# Patient Record
Sex: MALE | Race: WHITE | NOT HISPANIC OR LATINO | RURAL
[De-identification: names, ages, dates, MRNs, and addresses within clinical notes are randomized per-mention and may not be internally consistent; named-entity substitution may affect disease eponyms.]

---

## 2024-11-29 ENCOUNTER — OFFICE VISIT (OUTPATIENT)
Dept: FAMILY MEDICINE | Facility: CLINIC | Age: 64
End: 2024-11-29
Payer: MEDICARE

## 2024-11-29 VITALS
TEMPERATURE: 98 F | WEIGHT: 253 LBS | OXYGEN SATURATION: 98 % | SYSTOLIC BLOOD PRESSURE: 122 MMHG | DIASTOLIC BLOOD PRESSURE: 80 MMHG | HEART RATE: 90 BPM

## 2024-11-29 DIAGNOSIS — H60.312 ACUTE DIFFUSE OTITIS EXTERNA OF LEFT EAR: Primary | ICD-10-CM

## 2024-11-29 PROBLEM — R05.3 CHRONIC COUGH: Status: ACTIVE | Noted: 2022-05-27

## 2024-11-29 PROBLEM — J84.89 INTERSTITIAL LUNG DISEASE DUE TO GRANULOMATOUS DISEASE: Status: ACTIVE | Noted: 2024-11-29

## 2024-11-29 PROBLEM — B18.2 CHRONIC HEPATITIS C: Status: ACTIVE | Noted: 2024-11-29

## 2024-11-29 PROBLEM — C34.90 PRIMARY ADENOCARCINOMA OF LUNG: Status: ACTIVE | Noted: 2021-04-30

## 2024-11-29 PROBLEM — J70.1 CHRONIC AND OTHER PULMONARY MANIFESTATIONS DUE TO RADIATION: Status: ACTIVE | Noted: 2022-05-27

## 2024-11-29 PROBLEM — E66.3 OVERWEIGHT: Status: ACTIVE | Noted: 2024-11-29

## 2024-11-29 PROBLEM — D71 INTERSTITIAL LUNG DISEASE DUE TO GRANULOMATOUS DISEASE: Status: ACTIVE | Noted: 2024-11-29

## 2024-11-29 PROBLEM — R59.0 MEDIASTINAL LYMPHADENOPATHY: Status: ACTIVE | Noted: 2024-11-29

## 2024-11-29 PROBLEM — B19.20 UNSPECIFIED VIRAL HEPATITIS C WITHOUT HEPATIC COMA: Status: ACTIVE | Noted: 2024-11-29

## 2024-11-29 PROBLEM — M47.812 ARTHROPATHY OF CERVICAL FACET JOINT: Status: ACTIVE | Noted: 2024-11-29

## 2024-11-29 PROBLEM — Z76.0 ENCOUNTER FOR ISSUE OF REPEAT PRESCRIPTION: Status: ACTIVE | Noted: 2024-11-29

## 2024-11-29 PROBLEM — K57.30 DIVERTICULOSIS OF COLON: Status: ACTIVE | Noted: 2024-11-29

## 2024-11-29 PROBLEM — T14.90XA TRAUMA: Status: ACTIVE | Noted: 2018-07-24
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
## 2024-11-29 PROBLEM — Z71.9 COUNSELING, UNSPECIFIED: Status: ACTIVE | Noted: 2024-11-29

## 2024-11-29 PROBLEM — K63.5 POLYP OF COLON: Status: ACTIVE | Noted: 2022-08-12

## 2024-11-29 PROBLEM — C77.1 SECONDARY MALIGNANT NEOPLASM OF INTRATHORACIC LYMPH NODES: Status: ACTIVE | Noted: 2024-11-29

## 2024-11-29 PROBLEM — R91.8 MULTIPLE NODULES OF LUNG: Status: ACTIVE | Noted: 2018-07-24

## 2024-11-29 PROBLEM — K57.30 DIVERTICULOSIS OF SIGMOID COLON: Status: ACTIVE | Noted: 2024-11-29

## 2024-11-29 PROBLEM — R94.5 ABNORMAL RESULTS OF LIVER FUNCTION STUDIES: Status: ACTIVE | Noted: 2024-11-29

## 2024-11-29 PROBLEM — S42.402B: Status: ACTIVE | Noted: 2018-07-26

## 2024-11-29 PROBLEM — M00.9 PYOGENIC ARTHRITIS OF KNEE: Status: ACTIVE | Noted: 2020-05-05

## 2024-11-29 PROBLEM — K64.8 INTERNAL HEMORRHOIDS: Status: ACTIVE | Noted: 2017-05-10

## 2024-11-29 PROBLEM — S01.81XA LACERATION OF FOREHEAD: Status: ACTIVE | Noted: 2018-07-24

## 2024-11-29 PROBLEM — S53.106A UNSPECIFIED DISLOCATION OF UNSPECIFIED ULNOHUMERAL JOINT, INITIAL ENCOUNTER: Status: ACTIVE | Noted: 2018-08-20

## 2024-11-29 PROBLEM — Z87.891 EX-CIGARETTE SMOKER: Status: ACTIVE | Noted: 2024-11-29

## 2024-11-29 PROBLEM — S42.131A: Status: ACTIVE | Noted: 2022-10-18

## 2024-11-29 PROBLEM — G44.229 CHRONIC TENSION-TYPE HEADACHE: Status: ACTIVE | Noted: 2021-07-08

## 2024-11-29 PROBLEM — K31.89 OTHER DISEASES OF STOMACH AND DUODENUM: Status: ACTIVE | Noted: 2022-08-12

## 2024-11-29 PROBLEM — S52.023B: Status: ACTIVE | Noted: 2018-07-24

## 2024-11-29 PROBLEM — K57.30 DVRTCLOS OF LG INT W/O PERFORATION OR ABSCESS W/O BLEEDING: Status: ACTIVE | Noted: 2017-05-10

## 2024-11-29 PROBLEM — M25.562 ACUTE PAIN OF LEFT KNEE: Status: ACTIVE | Noted: 2020-06-06

## 2024-11-29 PROBLEM — C34.90 LUNG CANCER: Status: ACTIVE | Noted: 2024-11-29

## 2024-11-29 PROBLEM — M54.2 NECK PAIN: Status: ACTIVE | Noted: 2024-11-29

## 2024-11-29 PROBLEM — E66.9 OBESITY: Status: ACTIVE | Noted: 2024-11-29

## 2024-11-29 PROBLEM — T14.8XXA WOUND DRAINAGE: Status: ACTIVE | Noted: 2021-04-05

## 2024-11-29 PROBLEM — M25.511 PAIN IN JOINT OF RIGHT SHOULDER: Status: ACTIVE | Noted: 2022-10-18

## 2024-11-29 PROBLEM — G89.29 CHRONIC BACK PAIN: Status: ACTIVE | Noted: 2024-11-29

## 2024-11-29 PROBLEM — M19.029 ELBOW ARTHRITIS: Status: ACTIVE | Noted: 2020-02-18

## 2024-11-29 PROBLEM — K27.9 PEPTIC ULCER: Status: ACTIVE | Noted: 2024-11-29

## 2024-11-29 PROBLEM — Z86.0100 HISTORY OF COLONIC POLYPS: Status: ACTIVE | Noted: 2024-11-29

## 2024-11-29 PROBLEM — K22.2 ESOPHAGEAL OBSTRUCTION: Status: ACTIVE | Noted: 2022-08-12

## 2024-11-29 PROBLEM — S42.309B: Status: ACTIVE | Noted: 2018-07-24

## 2024-11-29 PROBLEM — J43.9 PULMONARY EMPHYSEMA: Status: ACTIVE | Noted: 2024-11-29

## 2024-11-29 PROBLEM — M25.462 KNEE EFFUSION, LEFT: Status: ACTIVE | Noted: 2020-06-06

## 2024-11-29 PROBLEM — R13.10 DYSPHAGIA: Status: ACTIVE | Noted: 2024-11-29

## 2024-11-29 PROBLEM — M54.9 CHRONIC BACK PAIN: Status: ACTIVE | Noted: 2024-11-29

## 2024-11-29 PROBLEM — T84.84XA PAINFUL ORTHOPAEDIC HARDWARE: Status: ACTIVE | Noted: 2020-02-18

## 2024-11-29 RX ORDER — AMOXICILLIN AND CLAVULANATE POTASSIUM 875; 125 MG/1; MG/1
1 TABLET, FILM COATED ORAL 2 TIMES DAILY
Qty: 20 TABLET | Refills: 0 | Status: SHIPPED | OUTPATIENT
Start: 2024-11-29 | End: 2024-12-09

## 2024-11-29 RX ORDER — PHENOL/SODIUM PHENOLATE
30 AEROSOL, SPRAY (ML) MUCOUS MEMBRANE
COMMUNITY
Start: 2024-11-05

## 2024-11-29 RX ORDER — ACETAMINOPHEN 500 MG
1000 TABLET ORAL
COMMUNITY

## 2024-11-29 RX ORDER — LIDOCAINE 50 MG/G
1 PATCH TOPICAL
COMMUNITY
Start: 2024-03-14

## 2024-11-29 RX ORDER — INDOMETHACIN 25 MG/1
CAPSULE ORAL
COMMUNITY

## 2024-11-29 RX ORDER — CIPROFLOXACIN HYDROCHLORIDE 3 MG/ML
SOLUTION/ DROPS OPHTHALMIC
Qty: 10 ML | Refills: 0 | Status: SHIPPED | OUTPATIENT
Start: 2024-11-29

## 2024-11-29 RX ORDER — METHOCARBAMOL 500 MG/1
TABLET, FILM COATED ORAL
COMMUNITY

## 2024-11-29 NOTE — PROGRESS NOTES
Subjective:       Patient ID: Devyn Dorsey is a 64 y.o. male.    Chief Complaint: Otalgia (Left ear pain and swelling . Started on monday)    Presents to clinic as above.  No fever. He is not diabetic.     Otalgia       Review of Systems   Constitutional: Negative.    HENT:  Positive for ear pain. Negative for congestion and sinus pain.    Respiratory: Negative.     Cardiovascular: Negative.    Musculoskeletal: Negative.           Reviewed family, medical, surgical, and social history.    Objective:      /80 (BP Location: Right arm, Patient Position: Sitting)   Pulse 90   Temp 98.3 °F (36.8 °C) (Oral)   Wt 114.8 kg (253 lb)   SpO2 98%   Physical Exam  Vitals and nursing note reviewed.   Constitutional:       General: He is not in acute distress.     Appearance: Normal appearance. He is normal weight. He is not ill-appearing, toxic-appearing or diaphoretic.   HENT:      Head: Normocephalic.      Right Ear: Tympanic membrane, ear canal and external ear normal.      Left Ear: Drainage, swelling and tenderness present.      Ears:      Comments: There is tenderness and redness to external ear with canal swelling and drainage. No wick's available in clinic.      Mouth/Throat:      Mouth: Mucous membranes are moist.   Cardiovascular:      Rate and Rhythm: Normal rate and regular rhythm.      Heart sounds: Normal heart sounds.   Pulmonary:      Effort: Pulmonary effort is normal.      Breath sounds: Normal breath sounds.   Musculoskeletal:      Cervical back: Normal range of motion and neck supple.   Skin:     General: Skin is warm and dry.      Capillary Refill: Capillary refill takes less than 2 seconds.   Neurological:      Mental Status: He is alert and oriented to person, place, and time.   Psychiatric:         Mood and Affect: Mood normal.         Behavior: Behavior normal.         Thought Content: Thought content normal.         Judgment: Judgment normal.            No results found for any previous visit.       Assessment:       1. Acute diffuse otitis externa of left ear        Plan:       Acute diffuse otitis externa of left ear  -     amoxicillin-clavulanate 875-125mg (AUGMENTIN) 875-125 mg per tablet; Take 1 tablet by mouth 2 (two) times a day. for 10 days  Dispense: 20 tablet; Refill: 0  -     ciprofloxacin HCl (CILOXAN) 0.3 % ophthalmic solution; 3 gtts in left ear BID for 7 days  Dispense: 10 mL; Refill: 0    Go to ER if not rapidly improving, worsening or fever. Voiced agreement.   RTC PRN          Risks, benefits, and side effects were discussed with the patient. All questions were answered to the fullest satisfaction of the patient, and pt verbalized understanding and agreement to treatment plan. Pt was to call with any new or worsening symptoms, or present to the ER.